# Patient Record
Sex: MALE | Race: OTHER | HISPANIC OR LATINO | URBAN - METROPOLITAN AREA
[De-identification: names, ages, dates, MRNs, and addresses within clinical notes are randomized per-mention and may not be internally consistent; named-entity substitution may affect disease eponyms.]

---

## 2017-02-27 ENCOUNTER — OUTPATIENT (OUTPATIENT)
Dept: OUTPATIENT SERVICES | Facility: HOSPITAL | Age: 22
LOS: 1 days | Discharge: HOME | End: 2017-02-27

## 2017-05-23 PROBLEM — Z00.00 ENCOUNTER FOR PREVENTIVE HEALTH EXAMINATION: Status: ACTIVE | Noted: 2017-05-23

## 2017-06-27 DIAGNOSIS — T15.02XS FOREIGN BODY IN CORNEA, LEFT EYE, SEQUELA: ICD-10-CM

## 2021-10-08 ENCOUNTER — EMERGENCY (EMERGENCY)
Facility: HOSPITAL | Age: 26
LOS: 0 days | Discharge: HOME | End: 2021-10-08
Attending: EMERGENCY MEDICINE
Payer: MEDICAID

## 2021-10-08 VITALS
SYSTOLIC BLOOD PRESSURE: 159 MMHG | HEART RATE: 82 BPM | RESPIRATION RATE: 17 BRPM | WEIGHT: 199.96 LBS | DIASTOLIC BLOOD PRESSURE: 101 MMHG | OXYGEN SATURATION: 100 % | TEMPERATURE: 98 F

## 2021-10-08 DIAGNOSIS — R10.9 UNSPECIFIED ABDOMINAL PAIN: ICD-10-CM
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
## 2021-10-08 PROCEDURE — 76870 US EXAM SCROTUM: CPT | Mod: 26

## 2021-10-08 PROCEDURE — 99284 EMERGENCY DEPT VISIT MOD MDM: CPT

## 2021-10-08 RX ORDER — IBUPROFEN 200 MG
600 TABLET ORAL ONCE
Refills: 0 | Status: COMPLETED | OUTPATIENT
Start: 2021-10-08 | End: 2021-10-08

## 2021-10-08 RX ADMIN — Medication 600 MILLIGRAM(S): at 21:31

## 2021-10-08 NOTE — ED PROVIDER NOTE - CLINICAL SUMMARY MEDICAL DECISION MAKING FREE TEXT BOX
evaluated for groin pain, symptoms typical of hernia, us consistent with symptoms, no signs of torsion or obstruction, patient to fu with surgery as outpatient.

## 2021-10-08 NOTE — ED PROVIDER NOTE - NSFOLLOWUPCLINICS_GEN_ALL_ED_FT
[facility]  Surgery  [address] [place] [address]  Phone: [phone]  Fax:   Follow Up Time: Urgent

## 2021-10-08 NOTE — ED PROVIDER NOTE - NS ED ROS FT
Constitutional:  No fevers or chills.  Eyes:  No visual changes, eye pain, or discharge.  ENT:  No hearing changes. No sore throat.  Neck:  No neck pain or stiffness.  Cardiac:  No CP or edema.  Resp:  No cough or SOB. No hemoptysis.   GI:  No nausea, vomiting, diarrhea, or abdominal pain.  :  (+) groin pain. No dysuria, frequency, or hematuria.  MSK:  No myalgias or joint pain/swelling.  Neuro:  No headache, dizziness, or weakness.  Skin:  No skin rash.

## 2021-10-08 NOTE — ED PROVIDER NOTE - PHYSICAL EXAMINATION
PHYSICAL EXAM: I have reviewed current vital signs.  GENERAL: NAD, well-nourished; well-developed.  HEAD:  Normocephalic, atraumatic.  EYES: EOMI, PERRL, conjunctiva and sclera clear.  ENT: MMM, no erythema/exudates.  NECK: Supple, no JVD.  CHEST/LUNG: Clear to auscultation bilaterally; no wheezes, rales, or rhonchi.  HEART: Regular rate and rhythm, normal S1 and S2; no murmurs, rubs, or gallops.  ABDOMEN: Soft, nontender, nondistended.  : b/l nl testes, nl scrotum, no lesions, no penile discharge, nontender exam.  mild swelling to the right inguinal area.   EXTREMITIES:  2+ peripheral pulses; no clubbing, cyanosis, or edema.  PSYCH: Cooperative, appropriate, normal mood and affect.  NEUROLOGY: A&O x 3. Motor 5/5. Sensory intact. No focal neurological deficits. CN II - XII intact. (-) dysmetria, facial droop, pronator drift.  SKIN: Warm and dry.

## 2021-10-08 NOTE — ED ADULT TRIAGE NOTE - CHIEF COMPLAINT QUOTE
Pt states that 2 weeks ago he was lifting something heavy at work when he felt sudden groin pain. Pt states he noticed his right groin was swollen yesterday.

## 2021-10-08 NOTE — ED PROVIDER NOTE - OBJECTIVE STATEMENT
26 y male with no PMH presents with right groin pain sudden onset while lifting heavy object at work 2 days prior pain is intermittent worse with lifting objects and bearing down.   sexually active no dysuria, no penile discharge.

## 2021-10-08 NOTE — ED PROVIDER NOTE - ATTENDING CONTRIBUTION TO CARE
right testicular pain that occurred 2 weeks ago and then reoccured 2 days ago after lifting heavy items, noticed some swelling but none now. no vomiting, nml bm today. no fevers. pain better lying flat. worse sitting up. exam shows nml cremasteric reflex, nml lie, no swelling noted. plan is to obtain US. no signs of torsion.

## 2021-10-08 NOTE — ED PROVIDER NOTE - PATIENT PORTAL LINK FT
You can access the FollowMyHealth Patient Portal offered by [facility] by registering at the following website: [email or web address]. By joining [employer]’s FollowMyHealth portal, you will also be able to view your health information using other applications (apps) compatible with our system.

## 2021-10-11 PROBLEM — Z78.9 OTHER SPECIFIED HEALTH STATUS: Chronic | Status: ACTIVE | Noted: 2021-10-08

## 2021-10-15 ENCOUNTER — APPOINTMENT (OUTPATIENT)
Dept: SURGERY | Facility: CLINIC | Age: 26
End: 2021-10-15
Payer: COMMERCIAL

## 2021-10-15 VITALS
OXYGEN SATURATION: 99 % | WEIGHT: 200 LBS | BODY MASS INDEX: 30.31 KG/M2 | HEART RATE: 105 BPM | TEMPERATURE: 97.2 F | DIASTOLIC BLOOD PRESSURE: 88 MMHG | HEIGHT: 68 IN | SYSTOLIC BLOOD PRESSURE: 136 MMHG

## 2021-10-15 DIAGNOSIS — N45.3 EPIDIDYMO-ORCHITIS: ICD-10-CM

## 2021-10-15 PROCEDURE — 99203 OFFICE O/P NEW LOW 30 MIN: CPT

## 2021-10-15 PROCEDURE — 99072 ADDL SUPL MATRL&STAF TM PHE: CPT

## 2021-10-15 RX ORDER — CIPROFLOXACIN HYDROCHLORIDE 500 MG/1
500 TABLET, FILM COATED ORAL
Qty: 14 | Refills: 2 | Status: ACTIVE | COMMUNITY
Start: 2021-10-15 | End: 1900-01-01

## 2021-10-26 NOTE — HISTORY OF PRESENT ILLNESS
[de-identified] : Mr. DANIELLE GONZALES is a 26 year  year old man. He presents to the office on 10/15/2021 , his primary is Unable to Collect PCP .\par \par He was lifting something 3 weeks ago and since then his both the groins and testicles are hurting. \par He denies dysuria. \par He complains of a palpable tenderness in the right testicle. \par us shows a small fat containing inguinal hernia.

## 2021-10-26 NOTE — ASSESSMENT
[FreeTextEntry1] : Mr. DANIELLE GONZALES is a 26 year  year old man. He presents to the office on 10/15/2021 , his primary is Unable to Collect PCP .\par \par He was lifting something 3 weeks ago and since then his both the groins and testicles are hurting. \par He denies dysuria. \par He complains of a palpable tendernes in the right testicel. \par us shows a small fat containing inguinal hernia. \par \par \par b/l testicular tenderness with cord tenderness on the right  no coupgh impulse or swelling in the inguinal region\par \par \par impression: epididomy orchitis. \par \par UA\par Urince culture\par urology consult\par \par his us finding of fat in the inguinal hernia - is unlikley causing the b/l pain \par he has no cough impulse\par once his testicular symptoms improve will talk again about possible surgery - right now not indicated. \par \par We explained in great detail the pathophysiology of the disease process. We antelmo diagrams and discussed the workup for diagnosis and management.\par The various options were explained to the patient. The Risk , benefit and alternatives were discussed. We discussed recovery and possible complications.\par The Post operative care was explained to the patient. He was counselled on diet , exercise and wound care.\par We discussed the pathology and surgery with him.\par \par \par We discussed the importance of close follow up. \par We informed that he needs to follow up in 2- 4 weeks .\par We also informed that he can call us if anything changes or has any questions.\par

## 2021-10-26 NOTE — PHYSICAL EXAM
[Respiratory Effort] : normal respiratory effort [JVD] : no jugular venous distention  [de-identified] : Normal  [de-identified] : Normal  [de-identified] : Normal  [de-identified] : b/l testicular tenderness with cord tenderness on the right  no coupgh impulse or swelling in the inguinal region

## 2021-10-27 LAB — BACTERIA UR CULT: NORMAL

## 2021-12-20 ENCOUNTER — APPOINTMENT (OUTPATIENT)
Dept: UROLOGY | Facility: CLINIC | Age: 26
End: 2021-12-20

## 2024-09-03 ENCOUNTER — EMERGENCY (EMERGENCY)
Facility: HOSPITAL | Age: 29
LOS: 0 days | Discharge: HOME | End: 2024-09-03
Attending: STUDENT IN AN ORGANIZED HEALTH CARE EDUCATION/TRAINING PROGRAM
Payer: MEDICAID

## 2024-09-03 VITALS
RESPIRATION RATE: 18 BRPM | OXYGEN SATURATION: 99 % | TEMPERATURE: 99 F | SYSTOLIC BLOOD PRESSURE: 145 MMHG | DIASTOLIC BLOOD PRESSURE: 84 MMHG | HEART RATE: 77 BPM

## 2024-09-03 DIAGNOSIS — Z23 ENCOUNTER FOR IMMUNIZATION: ICD-10-CM
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
## 2024-09-03 DIAGNOSIS — S05.01XA INJURY OF CONJUNCTIVA AND CORNEAL ABRASION WITHOUT FOREIGN BODY, RIGHT EYE, INITIAL ENCOUNTER: ICD-10-CM

## 2024-09-03 PROCEDURE — 99283 EMERGENCY DEPT VISIT LOW MDM: CPT | Mod: 25

## 2024-09-03 PROCEDURE — 90715 TDAP VACCINE 7 YRS/> IM: CPT

## 2024-09-03 PROCEDURE — 90471 IMMUNIZATION ADMIN: CPT

## 2024-09-03 PROCEDURE — 99284 EMERGENCY DEPT VISIT MOD MDM: CPT

## 2024-09-03 RX ORDER — POLYMYXIN B SULFATE AND TRIMETHOPRIM SULFATE 100000; 1 [USP'U]/ML; MG/ML
1 SOLUTION/ DROPS OPHTHALMIC ONCE
Refills: 0 | Status: COMPLETED | OUTPATIENT
Start: 2024-09-03 | End: 2024-09-03

## 2024-09-03 RX ORDER — TETANUS TOXOID, REDUCED DIPHTHERIA TOXOID AND ACELLULAR PERTUSSIS VACCINE, ADSORBED 5; 2.5; 8; 8; 2.5 [IU]/.5ML; [IU]/.5ML; UG/.5ML; UG/.5ML; UG/.5ML
0.5 SUSPENSION INTRAMUSCULAR ONCE
Refills: 0 | Status: COMPLETED | OUTPATIENT
Start: 2024-09-03 | End: 2024-09-03

## 2024-09-03 RX ADMIN — POLYMYXIN B SULFATE AND TRIMETHOPRIM SULFATE 1 DROP(S): 100000; 1 SOLUTION/ DROPS OPHTHALMIC at 20:05

## 2024-09-03 RX ADMIN — TETANUS TOXOID, REDUCED DIPHTHERIA TOXOID AND ACELLULAR PERTUSSIS VACCINE, ADSORBED 0.5 MILLILITER(S): 5; 2.5; 8; 8; 2.5 SUSPENSION INTRAMUSCULAR at 19:37

## 2024-09-03 NOTE — ED PROVIDER NOTE - BIRTH SEX
1st Attempt to complete SW follow-up for Outpatient Care Management; left message and sent portal letter with contact information requesting a return call. SW will reattempt at a later date.     [facility] confirmed with [facility] that pt was admitted on 7/28/2023.     Male

## 2024-09-03 NOTE — ED PROVIDER NOTE - OBJECTIVE STATEMENT
29-year-old male no past medical history presents with right eye irritation today.  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  Patient states he was able to remove small piece right after it happened, but continues to have irritation and tearing so came to ED.  Patient denies vision changes.  Patient has no other complaints.  Patient does not wear eyeglasses/contacts.

## 2024-09-03 NOTE — ED PROVIDER NOTE - NSFOLLOWUPINSTRUCTIONS_ED_ALL_ED_FT
Our Emergency Department Referral Coordinators will be reaching out to you in the next 24-48 hours from 9:00am to 5:00pm with a follow up appointment. Please expect a phone call from the hospital in that time frame. If you do not receive a call or if you have any questions or concerns, you can reach them at   [phone]     Follow up with Optho clinic in 1-3 days. Use Polytrim drops to affected eye 4 times per day while awake. Take Ibuprofen 600mg every 6 hours as needed for pain.    Corneal Abrasion    The cornea is the clear covering at the front and center of the eye. This very thin tissue is made up of many layers. If a scratch or injury causes the corneal epithelium to come off, it is called a corneal abrasion. Symptoms include eye pain, redness, tearing, difficulty keeping eye open, and light sensitivity. Do not drive or operate machinery if your eye is patched.  Antibiotic eye drops may be prescribed to reduce the risk of infection.  It is important to follow up with an ophthalmologist (eye doctor) to ensure proper healing.    SEEK IMMEDIATE MEDICAL CARE IF YOU HAVE ANY OF THE FOLLOWING SYMPTOMS: discharge from eyes, changes in vision, fever, or swelling.

## 2024-09-03 NOTE — ED PROVIDER NOTE - PHYSICAL EXAMINATION
Vital Signs: I have reviewed the initial vital signs.  CONSTITUTIONAL: Pt in no acute distress  SKIN: Skin exam is warm and dry, no acute rash.  HEAD: Normocephalic; atraumatic.  EYES: PERRL, EOM intact; +mild right conjunctival injection. Fluorescin exam with uptake over center of iris. No foreign body. Negative carlos sign.  ENT: No nasal discharge; airway clear.   NECK: Supple;   MSK: Normal ROM. No clubbing, cyanosis or edema.

## 2024-09-03 NOTE — ED PROVIDER NOTE - ATTENDING APP SHARED VISIT CONTRIBUTION OF CARE
29-year-old male presenting here complaining of foreign body in right eye.  Patient states he was cutting metal with a double vision does not wear contacts tetanus not up-to-date  CONSTITUTIONAL: WA / WN / NAD  HEAD: NCAT  EYES: EOMI; +fluorecin uptake at 6'oclock position   SKIN: Warm and dry;   NEURO: AAOx3  PSYCH: Memory Intact, Normal Affect

## 2024-09-03 NOTE — ED ADULT NURSE NOTE - OBJECTIVE STATEMENT
small piece of metal fell in pt's eye when working, pt states he took it out but still has discomfort and pain in the eye.

## 2024-09-03 NOTE — ED PROVIDER NOTE - NSFOLLOWUPCLINICS_GEN_ALL_ED_FT
[facility]  Ophthalmolgy  [address], [address]  [place] [address]  Phone: [phone]  Fax:

## 2024-09-03 NOTE — ED PROVIDER NOTE - PATIENT PORTAL LINK FT
You can access the FollowMyHealth Patient Portal offered by [facility] by registering at the following website: [email or web address]. By joining [email or web address]’s FollowMyHealth portal, you will also be able to view your health information using other applications (apps) compatible with our system.

## 2024-09-03 NOTE — ED PROVIDER NOTE - CLINICAL SUMMARY MEDICAL DECISION MAKING FREE TEXT BOX
29-year-old male presenting here complaining of foreign body in right eye.  Patient states he was cutting metal with a double vision does not wear contacts tetanus not up-to-date vs reviewed. Doug updated eye drops provided. Patient a spoken to in detail about results  All questions addressed.  Results of ED work up discussed Return precautions given. Recommended f.u with optho.

## 2025-01-10 ENCOUNTER — APPOINTMENT (OUTPATIENT)
Dept: INTERNAL MEDICINE | Facility: CLINIC | Age: 30
End: 2025-01-10